# Patient Record
Sex: MALE | Race: WHITE | NOT HISPANIC OR LATINO | Employment: UNEMPLOYED | URBAN - NONMETROPOLITAN AREA
[De-identification: names, ages, dates, MRNs, and addresses within clinical notes are randomized per-mention and may not be internally consistent; named-entity substitution may affect disease eponyms.]

---

## 2017-09-06 ENCOUNTER — HOSPITAL ENCOUNTER (EMERGENCY)
Facility: HOSPITAL | Age: 37
Discharge: HOME | End: 2017-09-06
Attending: EMERGENCY MEDICINE | Admitting: EMERGENCY MEDICINE

## 2017-09-06 VITALS
OXYGEN SATURATION: 98 % | RESPIRATION RATE: 16 BRPM | HEART RATE: 76 BPM | WEIGHT: 125 LBS | DIASTOLIC BLOOD PRESSURE: 80 MMHG | TEMPERATURE: 97.6 F | HEIGHT: 70 IN | BODY MASS INDEX: 17.9 KG/M2 | SYSTOLIC BLOOD PRESSURE: 120 MMHG

## 2017-09-06 DIAGNOSIS — E86.0 MILD DEHYDRATION: Primary | ICD-10-CM

## 2017-09-06 LAB
6-ACETYL MORPHINE: NEGATIVE
ALBUMIN SERPL-MCNC: 4.7 G/DL (ref 3.5–5)
ALBUMIN/GLOB SERPL: 1.6 G/DL (ref 1.5–2.5)
ALP SERPL-CCNC: 76 U/L (ref 40–129)
ALT SERPL W P-5'-P-CCNC: 38 U/L (ref 10–44)
AMPHET+METHAMPHET UR QL: POSITIVE
ANION GAP SERPL CALCULATED.3IONS-SCNC: 2.9 MMOL/L (ref 3.6–11.2)
AST SERPL-CCNC: 43 U/L (ref 10–34)
BACTERIA UR QL AUTO: ABNORMAL /HPF
BARBITURATES UR QL SCN: NEGATIVE
BASOPHILS # BLD AUTO: 0.04 10*3/MM3 (ref 0–0.3)
BASOPHILS NFR BLD AUTO: 0.6 % (ref 0–2)
BENZODIAZ UR QL SCN: NEGATIVE
BILIRUB SERPL-MCNC: 0.8 MG/DL (ref 0.2–1.8)
BILIRUB UR QL STRIP: NEGATIVE
BUN BLD-MCNC: 16 MG/DL (ref 7–21)
BUN/CREAT SERPL: 18.2 (ref 7–25)
BUPRENORPHINE SERPL-MCNC: NEGATIVE NG/ML
CALCIUM SPEC-SCNC: 10 MG/DL (ref 7.7–10)
CANNABINOIDS SERPL QL: NEGATIVE
CHLORIDE SERPL-SCNC: 107 MMOL/L (ref 99–112)
CLARITY UR: CLEAR
CO2 SERPL-SCNC: 28.1 MMOL/L (ref 24.3–31.9)
COCAINE UR QL: NEGATIVE
COLOR UR: YELLOW
CREAT BLD-MCNC: 0.88 MG/DL (ref 0.43–1.29)
DEPRECATED RDW RBC AUTO: 43.5 FL (ref 37–54)
EOSINOPHIL # BLD AUTO: 0.2 10*3/MM3 (ref 0–0.7)
EOSINOPHIL NFR BLD AUTO: 2.8 % (ref 0–5)
ERYTHROCYTE [DISTWIDTH] IN BLOOD BY AUTOMATED COUNT: 13.1 % (ref 11.5–14.5)
GFR SERPL CREATININE-BSD FRML MDRD: 99 ML/MIN/1.73
GLOBULIN UR ELPH-MCNC: 3 GM/DL
GLUCOSE BLD-MCNC: 105 MG/DL (ref 70–110)
GLUCOSE UR STRIP-MCNC: NEGATIVE MG/DL
HCT VFR BLD AUTO: 43 % (ref 42–52)
HGB BLD-MCNC: 14.8 G/DL (ref 14–18)
HGB UR QL STRIP.AUTO: NEGATIVE
HYALINE CASTS UR QL AUTO: ABNORMAL /LPF
IMM GRANULOCYTES # BLD: 0.02 10*3/MM3 (ref 0–0.03)
IMM GRANULOCYTES NFR BLD: 0.3 % (ref 0–0.5)
KETONES UR QL STRIP: ABNORMAL
LEUKOCYTE ESTERASE UR QL STRIP.AUTO: NEGATIVE
LYMPHOCYTES # BLD AUTO: 2.55 10*3/MM3 (ref 1–3)
LYMPHOCYTES NFR BLD AUTO: 35.1 % (ref 21–51)
MCH RBC QN AUTO: 32 PG (ref 27–33)
MCHC RBC AUTO-ENTMCNC: 34.4 G/DL (ref 33–37)
MCV RBC AUTO: 93.1 FL (ref 80–94)
METHADONE UR QL SCN: NEGATIVE
MONOCYTES # BLD AUTO: 0.98 10*3/MM3 (ref 0.1–0.9)
MONOCYTES NFR BLD AUTO: 13.5 % (ref 0–10)
NEUTROPHILS # BLD AUTO: 3.48 10*3/MM3 (ref 1.4–6.5)
NEUTROPHILS NFR BLD AUTO: 47.7 % (ref 30–70)
NITRITE UR QL STRIP: NEGATIVE
OPIATES UR QL: NEGATIVE
OSMOLALITY SERPL CALC.SUM OF ELEC: 277.2 MOSM/KG (ref 273–305)
OXYCODONE UR QL SCN: NEGATIVE
PCP UR QL SCN: NEGATIVE
PH UR STRIP.AUTO: 5.5 [PH] (ref 5–8)
PLATELET # BLD AUTO: 205 10*3/MM3 (ref 130–400)
PMV BLD AUTO: 10.8 FL (ref 6–10)
POTASSIUM BLD-SCNC: 4.2 MMOL/L (ref 3.5–5.3)
PROT SERPL-MCNC: 7.7 G/DL (ref 6–8)
PROT UR QL STRIP: ABNORMAL
RBC # BLD AUTO: 4.62 10*6/MM3 (ref 4.7–6.1)
RBC # UR: ABNORMAL /HPF
REF LAB TEST METHOD: ABNORMAL
S PYO AG THROAT QL: NEGATIVE
SODIUM BLD-SCNC: 138 MMOL/L (ref 135–153)
SP GR UR STRIP: 1.02 (ref 1–1.03)
SQUAMOUS #/AREA URNS HPF: ABNORMAL /HPF
UROBILINOGEN UR QL STRIP: ABNORMAL
WBC NRBC COR # BLD: 7.27 10*3/MM3 (ref 4.5–12.5)
WBC UR QL AUTO: ABNORMAL /HPF

## 2017-09-06 PROCEDURE — 81001 URINALYSIS AUTO W/SCOPE: CPT | Performed by: EMERGENCY MEDICINE

## 2017-09-06 PROCEDURE — 80053 COMPREHEN METABOLIC PANEL: CPT | Performed by: EMERGENCY MEDICINE

## 2017-09-06 PROCEDURE — 85025 COMPLETE CBC W/AUTO DIFF WBC: CPT | Performed by: EMERGENCY MEDICINE

## 2017-09-06 PROCEDURE — 87880 STREP A ASSAY W/OPTIC: CPT | Performed by: EMERGENCY MEDICINE

## 2017-09-06 PROCEDURE — 80307 DRUG TEST PRSMV CHEM ANLYZR: CPT | Performed by: EMERGENCY MEDICINE

## 2017-09-06 PROCEDURE — 99283 EMERGENCY DEPT VISIT LOW MDM: CPT

## 2017-09-06 PROCEDURE — 96374 THER/PROPH/DIAG INJ IV PUSH: CPT

## 2017-09-06 PROCEDURE — 25010000002 ONDANSETRON PER 1 MG: Performed by: EMERGENCY MEDICINE

## 2017-09-06 PROCEDURE — 87081 CULTURE SCREEN ONLY: CPT | Performed by: EMERGENCY MEDICINE

## 2017-09-06 PROCEDURE — 96361 HYDRATE IV INFUSION ADD-ON: CPT

## 2017-09-06 RX ORDER — IBUPROFEN 600 MG/1
600 TABLET ORAL ONCE
Status: COMPLETED | OUTPATIENT
Start: 2017-09-06 | End: 2017-09-06

## 2017-09-06 RX ORDER — SODIUM CHLORIDE 0.9 % (FLUSH) 0.9 %
10 SYRINGE (ML) INJECTION AS NEEDED
Status: DISCONTINUED | OUTPATIENT
Start: 2017-09-06 | End: 2017-09-06 | Stop reason: HOSPADM

## 2017-09-06 RX ORDER — ONDANSETRON 2 MG/ML
4 INJECTION INTRAMUSCULAR; INTRAVENOUS ONCE
Status: COMPLETED | OUTPATIENT
Start: 2017-09-06 | End: 2017-09-06

## 2017-09-06 RX ADMIN — SODIUM CHLORIDE 1000 ML: 9 INJECTION, SOLUTION INTRAVENOUS at 08:05

## 2017-09-06 RX ADMIN — IBUPROFEN 600 MG: 600 TABLET ORAL at 09:53

## 2017-09-06 RX ADMIN — ONDANSETRON 4 MG: 2 INJECTION, SOLUTION INTRAMUSCULAR; INTRAVENOUS at 08:04

## 2017-09-06 NOTE — ED PROVIDER NOTES
Subjective   Patient is a 36 y.o. male presenting with dizziness.   History provided by:  Patient   used: No    Dizziness   Quality:  Lightheadedness  Severity:  Moderate  Onset quality:  Gradual  Duration:  2 days  Timing:  Constant  Progression:  Worsening  Chronicity:  New  Context: standing up    Relieved by:  None tried  Worsened by:  Standing up  Ineffective treatments:  None tried  Associated symptoms: weakness    Associated symptoms: no chest pain    Associated symptoms comment:  Sore throat      Review of Systems   Constitutional: Negative for fever.   HENT: Negative.    Respiratory: Negative.    Cardiovascular: Negative.  Negative for chest pain.   Gastrointestinal: Negative.  Negative for abdominal pain.   Endocrine: Negative.    Genitourinary: Negative.  Negative for dysuria.   Skin: Negative.    Neurological: Positive for dizziness and weakness.   Psychiatric/Behavioral: Negative.    All other systems reviewed and are negative.      History reviewed. No pertinent past medical history.    Allergies   Allergen Reactions   • Bactrim [Sulfamethoxazole-Trimethoprim]        No past surgical history on file.    No family history on file.    Social History     Social History   • Marital status: Single     Spouse name: N/A   • Number of children: N/A   • Years of education: N/A     Social History Main Topics   • Smoking status: None   • Smokeless tobacco: None   • Alcohol use None   • Drug use: None   • Sexual activity: Not Asked     Other Topics Concern   • None     Social History Narrative   • None           Objective   Physical Exam   Constitutional: He is oriented to person, place, and time. He appears well-developed and well-nourished. No distress.   HENT:   Head: Normocephalic and atraumatic.   Right Ear: External ear normal.   Left Ear: External ear normal.   Nose: Nose normal.   Eyes: Conjunctivae and EOM are normal. Pupils are equal, round, and reactive to light.   Neck: Normal range  of motion. Neck supple. No JVD present. No tracheal deviation present.   Cardiovascular: Normal rate, regular rhythm and normal heart sounds.    No murmur heard.  Pulmonary/Chest: Effort normal and breath sounds normal. No respiratory distress. He has no wheezes.   Abdominal: Soft. Bowel sounds are normal. There is no tenderness.   Musculoskeletal: Normal range of motion. He exhibits no edema or deformity.   Neurological: He is alert and oriented to person, place, and time. No cranial nerve deficit.   Skin: Skin is warm and dry. No rash noted. He is not diaphoretic. No erythema. No pallor.   Psychiatric: He has a normal mood and affect. His behavior is normal. Thought content normal.   Nursing note and vitals reviewed.      Procedures        Lab Results (last 24 hours)     Procedure Component Value Units Date/Time    CBC & Differential [[id number]] Collected:  09/06/17 0757    Specimen:  Blood Updated:  09/06/17 0842    Narrative:       The following orders were created for panel order CBC & Differential.  Procedure                               Abnormality         Status                     ---------                               -----------         ------                     CBC Auto Differential[[id number]]        Abnormal            Final result                 Please view results for these tests on the individual orders.    Comprehensive Metabolic Panel [[id number]]  (Abnormal) Collected:  09/06/17 0757    Specimen:  Blood from Arm, Right Updated:  09/06/17 0824     Glucose 105 mg/dL      BUN 16 mg/dL      Creatinine 0.88 mg/dL      Sodium 138 mmol/L      Potassium 4.2 mmol/L      Chloride 107 mmol/L      CO2 28.1 mmol/L      Calcium 10.0 mg/dL      Total Protein 7.7 g/dL      Albumin 4.70 g/dL      ALT (SGPT) 38 U/L      AST (SGOT) 43 (H) U/L      Alkaline Phosphatase 76 U/L       Note New Reference Ranges        Total Bilirubin 0.8 mg/dL      eGFR Non African Amer 99 mL/min/1.73      Globulin 3.0 gm/dL      A/G  Ratio 1.6 g/dL      BUN/Creatinine Ratio 18.2     Anion Gap 2.9 (L) mmol/L     CBC Auto Differential [[id number]]  (Abnormal) Collected:  09/06/17 0757    Specimen:  Blood from Arm, Right Updated:  09/06/17 0842     WBC 7.27 10*3/mm3      RBC 4.62 (L) 10*6/mm3      Hemoglobin 14.8 g/dL      Hematocrit 43.0 %      MCV 93.1 fL      MCH 32.0 pg      MCHC 34.4 g/dL      RDW 13.1 %      RDW-SD 43.5 fl      MPV 10.8 (H) fL      Platelets 205 10*3/mm3      Neutrophil % 47.7 %      Lymphocyte % 35.1 %      Monocyte % 13.5 (H) %      Eosinophil % 2.8 %      Basophil % 0.6 %      Immature Grans % 0.3 %      Neutrophils, Absolute 3.48 10*3/mm3      Lymphocytes, Absolute 2.55 10*3/mm3      Monocytes, Absolute 0.98 (H) 10*3/mm3      Eosinophils, Absolute 0.20 10*3/mm3      Basophils, Absolute 0.04 10*3/mm3      Immature Grans, Absolute 0.02 10*3/mm3     Rapid Strep A Screen [[id number]]  (Normal) Collected:  09/06/17 0759    Specimen:  Swab from Throat Updated:  09/06/17 0832     Strep A Ag Negative    Beta Strep Culture, Throat [[id number]] Collected:  09/06/17 0759    Specimen:  Swab from Throat Updated:  09/06/17 0831    Urinalysis With / Culture If Indicated [[id number]]  (Abnormal) Collected:  09/06/17 0859    Specimen:  Urine from Urine, Clean Catch Updated:  09/06/17 0920     Color, UA Yellow     Appearance, UA Clear     pH, UA 5.5     Specific Gravity, UA 1.025     Glucose, UA Negative     Ketones, UA Trace (A)     Bilirubin, UA Negative     Blood, UA Negative     Protein, UA 30 mg/dL (1+) (A)     Leuk Esterase, UA Negative     Nitrite, UA Negative     Urobilinogen, UA 0.2 E.U./dL    Urine Drug Screen [[id number]] Collected:  09/06/17 0859    Specimen:  Urine from Urine, Clean Catch Updated:  09/06/17 0902    Urinalysis, Microscopic Only [[id number]]  (Abnormal) Collected:  09/06/17 0859    Specimen:  Urine from Urine, Clean Catch Updated:  09/06/17 0920     RBC, UA 0-2 (A) /HPF      WBC, UA 0-2 (A) /HPF      Bacteria, UA None  Seen /HPF      Squamous Epithelial Cells, UA 0-2 /HPF      Hyaline Casts, UA 3-6 /LPF      Methodology Automated Microscopy            ED Course  ED Course    Improved after fluids              MDM    Final diagnoses:   Mild dehydration            Marquis Daugherty MD  09/06/17 7466

## 2017-09-06 NOTE — ED NOTES
Gave pt urine specimen cup and urged him to give a sample ASAP     Teresa Naranjo  09/06/17 0802

## 2017-09-06 NOTE — DISCHARGE INSTRUCTIONS

## 2017-09-07 LAB — BACTERIA SPEC AEROBE CULT: NORMAL

## 2019-06-03 ENCOUNTER — HOSPITAL ENCOUNTER (EMERGENCY)
Facility: MEDICAL CENTER | Age: 39
End: 2019-06-03
Attending: EMERGENCY MEDICINE

## 2019-06-03 VITALS
DIASTOLIC BLOOD PRESSURE: 79 MMHG | RESPIRATION RATE: 18 BRPM | OXYGEN SATURATION: 97 % | SYSTOLIC BLOOD PRESSURE: 118 MMHG | HEIGHT: 71 IN | TEMPERATURE: 98.6 F | WEIGHT: 133.6 LBS | HEART RATE: 89 BPM | BODY MASS INDEX: 18.7 KG/M2

## 2019-06-03 DIAGNOSIS — S39.012A LUMBOSACRAL STRAIN, INITIAL ENCOUNTER: ICD-10-CM

## 2019-06-03 DIAGNOSIS — S29.019A STRAIN OF THORACIC REGION, INITIAL ENCOUNTER: ICD-10-CM

## 2019-06-03 PROCEDURE — A9270 NON-COVERED ITEM OR SERVICE: HCPCS | Performed by: EMERGENCY MEDICINE

## 2019-06-03 PROCEDURE — 700102 HCHG RX REV CODE 250 W/ 637 OVERRIDE(OP): Performed by: EMERGENCY MEDICINE

## 2019-06-03 PROCEDURE — 700111 HCHG RX REV CODE 636 W/ 250 OVERRIDE (IP): Performed by: EMERGENCY MEDICINE

## 2019-06-03 PROCEDURE — 99284 EMERGENCY DEPT VISIT MOD MDM: CPT

## 2019-06-03 PROCEDURE — 96372 THER/PROPH/DIAG INJ SC/IM: CPT

## 2019-06-03 RX ORDER — IBUPROFEN 600 MG/1
600 TABLET ORAL EVERY 6 HOURS PRN
Qty: 20 TAB | Refills: 0 | Status: SHIPPED | OUTPATIENT
Start: 2019-06-03

## 2019-06-03 RX ORDER — KETOROLAC TROMETHAMINE 30 MG/ML
30 INJECTION, SOLUTION INTRAMUSCULAR; INTRAVENOUS ONCE
Status: COMPLETED | OUTPATIENT
Start: 2019-06-03 | End: 2019-06-03

## 2019-06-03 RX ORDER — METHOCARBAMOL 500 MG/1
500 TABLET, FILM COATED ORAL ONCE
Status: COMPLETED | OUTPATIENT
Start: 2019-06-03 | End: 2019-06-03

## 2019-06-03 RX ORDER — METHOCARBAMOL 500 MG/1
500 TABLET, FILM COATED ORAL EVERY 6 HOURS PRN
Qty: 20 TAB | Refills: 0 | Status: SHIPPED | OUTPATIENT
Start: 2019-06-03

## 2019-06-03 RX ORDER — DEXAMETHASONE SODIUM PHOSPHATE 10 MG/ML
10 INJECTION, SOLUTION INTRAMUSCULAR; INTRAVENOUS ONCE
Status: COMPLETED | OUTPATIENT
Start: 2019-06-03 | End: 2019-06-03

## 2019-06-03 RX ADMIN — METHOCARBAMOL TABLETS 500 MG: 500 TABLET, COATED ORAL at 13:50

## 2019-06-03 RX ADMIN — DEXAMETHASONE SODIUM PHOSPHATE 10 MG: 10 INJECTION INTRAMUSCULAR; INTRAVENOUS at 13:50

## 2019-06-03 RX ADMIN — KETOROLAC TROMETHAMINE 30 MG: 30 INJECTION, SOLUTION INTRAMUSCULAR; INTRAVENOUS at 13:49

## 2019-06-03 NOTE — ED NOTES
Pt ambulates to triage with c/c back pain that radiates into left leg.  Pt relates pain to helping friends move furniture.  A&ox4.  Pt to lobby & advised to inform RN of any change.s

## 2019-06-03 NOTE — DISCHARGE INSTRUCTIONS
Follow-up with primary care when you return to [place] for reevaluation, to establish care, for medication management and close blood pressure monitoring.  If back pain persists, referral for physical therapy, imaging/MRI or spine/pain specialist may be indicated.    Motrin and Robaxin every 6 hours as needed for pain or spasm.    Slow stretching range of motion exercises recommended.  Light activity.    Return to the emergency department for intractable back pain, extremity paresthesias or focal weakness, incontinence or urinary retention, abdominal pain, fever, vomiting or other new concerns.

## 2019-06-03 NOTE — ED PROVIDER NOTES
"ED Provider Note    CHIEF COMPLAINT  Chief Complaint   Patient presents with   • T-5000     c/c back pain, radiating down left leg       HPI  Emilio Ness is a 38 y.o. male who ambulates to the emergency department through triage for back pain.  Patient states he was helping friends move furniture this morning when shortly thereafter he developed some mid and low back pain.  Occasionally some discomfort and tingling \"like it is asleep\" radiating to the left lower extremity.  No abdominal pain or flank pain.  No dysuria, hematuria.  No extremity weakness.  No incontinence or urinary retention.  Denies history for similar symptoms.  Pain is much worse with range of motion.  No medications taken for discomfort prior to arrival.  Denies IV drug use.    REVIEW OF SYSTEMS  See HPI for further details. All other systems are negative.     PAST MEDICAL HISTORY   Denies    SOCIAL HISTORY  Social History     Social History Main Topics   • Smoking status: Current Every Day Smoker   • Smokeless tobacco: Never Used   • Alcohol use Yes      Comment: occasion    • Drug use: No   • Sexual activity: Not on file       SURGICAL HISTORY  patient denies any surgical history    CURRENT MEDICATIONS  Home Medications     Reviewed by Sujatha Nelson R.N. (Registered Nurse) on 06/03/19 at 1303  Med List Status: Complete   Medication Last Dose Status        Patient Nathanael Taking any Medications                       ALLERGIES  Allergies   Allergen Reactions   • Bactrim [Sulfamethoxazole W-Trimethoprim]      Hives       PHYSICAL EXAM  VITAL SIGNS: /77   Pulse 99   Temp 36.8 °C (98.3 °F) (Temporal)   Resp 14   Ht 1.803 m (5' 11\")   Wt 60.6 kg (133 lb 9.6 oz)   SpO2 97%   BMI 18.63 kg/m²   Pulse ox interpretation: I interpret this pulse ox as normal.  Constitutional: Alert in no apparent distress.  HENT: Normocephalic, atraumatic. Bilateral external ears normal, Nose normal. Moist mucous membranes.    Eyes: Conjunctiva normal. "   Neck: No midline or paraspinal discomfort.  Full range of motion without pain or resistance.  Cardiovascular: Normal peripheral perfusion.  Thorax & Lungs: Nonlabored respirations.  Abdomen: Soft, non-distended, non-tender to palpation. No palpable or pulsatile masses. No peritoneal signs. No CVA tenderness to percussion.  Skin: Warm, Dry, No erythema, No rash.   Musculoskeletal: Good range of motion in all major joints. No major deformities noted.  No midline thoracic or lumbar pain.  There is definite reproducible discomfort with palpation along the paraspinal musculature in the mid low thoracic and upper lumbar region on the left.  No cutaneous changes.  Neurologic: Alert and oriented x4.  Ambulates independently.  5 out of 5 strength in bilateral lower extremities both proximal and distal muscle groups.  2+ patellar DTR bilaterally.  Sensation intact light touch, no saddle anesthesia.  Straight leg raise intact bilaterally although some discomfort on the left.  Psychiatric: Flat affect, cooperative peer    COURSE & MEDICAL DECISION MAKING  ED evaluation most consistent with acute back strain, thoracic and lumbosacral strain, there is some radicular component although patient is otherwise neurologically intact.  He bears weight and ambulates independently.  No focal weakness.  No history no clinical evidence to suggest cauda equina, epidural abscess/hematoma or other spinal cord compression syndrome.  Denies trauma other than strain pattern described above while lifting furniture.  Abdominal exam is benign.  No chest pain or shortness of breath.  Unlikely dissection.  Symptomatology is atypical for ureteral colic.  Pain much improved after Toradol and Robaxin.  Patient received Decadron as well.    Patient is stable for discharge at this time, anticipatory guidance provided, Motrin and Robaxin as needed for pain or spasm, close follow-up is encouraged, and strict ED return instructions have been detailed.  Patient is agreeable to the disposition and plan.    Patient's blood pressure was elevated in the emergency department, and has been referred to primary care for close monitoring.  FINAL IMPRESSION  (S29.019A) Strain of thoracic region, initial encounter  (S39.012A) Lumbosacral strain, initial encounter      Electronically signed by: Sona Vu, 6/3/2019 2:19 PM      This dictation was created using voice recognition software. The accuracy of the dictation is limited to the abilities of the software. I expect there may be some errors of grammar and possibly content. The nursing notes were reviewed and certain aspects of this information were incorporated into this note.